# Patient Record
Sex: MALE | Race: WHITE | NOT HISPANIC OR LATINO | Employment: OTHER | URBAN - NONMETROPOLITAN AREA
[De-identification: names, ages, dates, MRNs, and addresses within clinical notes are randomized per-mention and may not be internally consistent; named-entity substitution may affect disease eponyms.]

---

## 2018-03-22 ENCOUNTER — OFFICE VISIT CONVERTED (OUTPATIENT)
Dept: FAMILY MEDICINE CLINIC | Age: 43
End: 2018-03-22
Attending: NURSE PRACTITIONER

## 2018-04-17 ENCOUNTER — OFFICE VISIT CONVERTED (OUTPATIENT)
Dept: CARDIOLOGY | Facility: CLINIC | Age: 43
End: 2018-04-17
Attending: INTERNAL MEDICINE

## 2018-05-15 ENCOUNTER — OFFICE VISIT CONVERTED (OUTPATIENT)
Dept: FAMILY MEDICINE CLINIC | Age: 43
End: 2018-05-15
Attending: NURSE PRACTITIONER

## 2019-05-13 ENCOUNTER — HOSPITAL ENCOUNTER (OUTPATIENT)
Dept: OTHER | Facility: HOSPITAL | Age: 44
Discharge: HOME | End: 2019-05-13
Attending: NURSE PRACTITIONER

## 2019-05-13 ENCOUNTER — OFFICE VISIT CONVERTED (OUTPATIENT)
Dept: FAMILY MEDICINE CLINIC | Age: 44
End: 2019-05-13
Attending: NURSE PRACTITIONER

## 2019-05-13 LAB
ALBUMIN SERPL-MCNC: 4.8 G/DL (ref 3.5–5)
ALBUMIN/GLOB SERPL: 1.8 {RATIO} (ref 1.4–2.6)
ALP SERPL-CCNC: 80 U/L (ref 53–128)
ALT SERPL-CCNC: 22 U/L (ref 10–40)
ANION GAP SERPL CALC-SCNC: 15 MMOL/L (ref 8–19)
AST SERPL-CCNC: 20 U/L (ref 15–50)
BILIRUB SERPL-MCNC: 0.27 MG/DL (ref 0.2–1.3)
BUN SERPL-MCNC: 13 MG/DL (ref 5–25)
BUN/CREAT SERPL: 11 {RATIO} (ref 6–20)
CALCIUM SERPL-MCNC: 9.8 MG/DL (ref 8.7–10.4)
CHLORIDE SERPL-SCNC: 100 MMOL/L (ref 99–111)
CONV CO2: 28 MMOL/L (ref 22–32)
CONV TOTAL PROTEIN: 7.4 G/DL (ref 6.3–8.2)
CREAT UR-MCNC: 1.21 MG/DL (ref 0.7–1.2)
ERYTHROCYTE [DISTWIDTH] IN BLOOD BY AUTOMATED COUNT: 12 % (ref 11.5–14.5)
GFR SERPLBLD BASED ON 1.73 SQ M-ARVRAT: >60 ML/MIN/{1.73_M2}
GLOBULIN UR ELPH-MCNC: 2.6 G/DL (ref 2–3.5)
GLUCOSE SERPL-MCNC: 106 MG/DL (ref 70–99)
HBA1C MFR BLD: 15.7 G/DL (ref 14–18)
HCT VFR BLD AUTO: 44.7 % (ref 42–52)
MAGNESIUM SERPL-MCNC: 2.23 MG/DL (ref 1.6–2.3)
MCH RBC QN AUTO: 30.2 PG (ref 27–31)
MCHC RBC AUTO-ENTMCNC: 35.1 G/DL (ref 33–37)
MCV RBC AUTO: 86 FL (ref 80–96)
OSMOLALITY SERPL CALC.SUM OF ELEC: 287 MOSM/KG (ref 273–304)
PLATELET # BLD AUTO: 229 10*3/UL (ref 130–400)
PMV BLD AUTO: 11.1 FL (ref 7.4–10.4)
POTASSIUM SERPL-SCNC: 4.9 MMOL/L (ref 3.5–5.3)
RBC # BLD AUTO: 5.2 10*6/UL (ref 4.7–6.1)
SODIUM SERPL-SCNC: 138 MMOL/L (ref 135–147)
T4 FREE SERPL-MCNC: 1.3 NG/DL (ref 0.9–1.8)
TSH SERPL-ACNC: 4.69 M[IU]/L (ref 0.27–4.2)
WBC # BLD AUTO: 4.69 10*3/UL (ref 4.8–10.8)

## 2019-05-15 LAB — CONV ANTI MICROSOMAL AB: 12 IU/ML (ref 0–34)

## 2019-05-20 ENCOUNTER — OFFICE VISIT CONVERTED (OUTPATIENT)
Dept: UROLOGY | Facility: CLINIC | Age: 44
End: 2019-05-20
Attending: UROLOGY

## 2019-07-02 ENCOUNTER — HOSPITAL ENCOUNTER (OUTPATIENT)
Dept: PERIOP | Facility: HOSPITAL | Age: 44
Setting detail: HOSPITAL OUTPATIENT SURGERY
Discharge: HOME | End: 2019-07-02
Attending: UROLOGY

## 2019-08-06 ENCOUNTER — CONVERSION ENCOUNTER (OUTPATIENT)
Dept: OTHER | Facility: HOSPITAL | Age: 44
End: 2019-08-06

## 2019-08-06 ENCOUNTER — OFFICE VISIT CONVERTED (OUTPATIENT)
Dept: CARDIOLOGY | Facility: CLINIC | Age: 44
End: 2019-08-06
Attending: INTERNAL MEDICINE

## 2019-08-12 ENCOUNTER — CONVERSION ENCOUNTER (OUTPATIENT)
Dept: SURGERY | Facility: CLINIC | Age: 44
End: 2019-08-12

## 2019-08-12 ENCOUNTER — OFFICE VISIT CONVERTED (OUTPATIENT)
Dept: UROLOGY | Facility: CLINIC | Age: 44
End: 2019-08-12
Attending: UROLOGY

## 2019-11-20 ENCOUNTER — OFFICE VISIT CONVERTED (OUTPATIENT)
Dept: CARDIOLOGY | Facility: CLINIC | Age: 44
End: 2019-11-20
Attending: INTERNAL MEDICINE

## 2019-11-20 ENCOUNTER — CONVERSION ENCOUNTER (OUTPATIENT)
Dept: CARDIOLOGY | Facility: CLINIC | Age: 44
End: 2019-11-20

## 2020-09-03 ENCOUNTER — OFFICE VISIT CONVERTED (OUTPATIENT)
Dept: FAMILY MEDICINE CLINIC | Age: 45
End: 2020-09-03
Attending: NURSE PRACTITIONER

## 2020-09-09 ENCOUNTER — OFFICE VISIT CONVERTED (OUTPATIENT)
Dept: CARDIOLOGY | Facility: CLINIC | Age: 45
End: 2020-09-09
Attending: INTERNAL MEDICINE

## 2020-09-09 ENCOUNTER — CONVERSION ENCOUNTER (OUTPATIENT)
Dept: OTHER | Facility: HOSPITAL | Age: 45
End: 2020-09-09

## 2020-09-09 ENCOUNTER — HOSPITAL ENCOUNTER (OUTPATIENT)
Dept: OTHER | Facility: HOSPITAL | Age: 45
Discharge: HOME | End: 2020-09-09
Attending: NURSE PRACTITIONER

## 2020-09-09 LAB
ALBUMIN SERPL-MCNC: 4.4 G/DL (ref 3.5–5)
ALBUMIN/GLOB SERPL: 1.7 {RATIO} (ref 1.4–2.6)
ALP SERPL-CCNC: 69 U/L (ref 53–128)
ALT SERPL-CCNC: 23 U/L (ref 10–40)
ANION GAP SERPL CALC-SCNC: 17 MMOL/L (ref 8–19)
AST SERPL-CCNC: 21 U/L (ref 15–50)
BASOPHILS # BLD MANUAL: 0.02 10*3/UL (ref 0–0.2)
BASOPHILS NFR BLD MANUAL: 0.4 % (ref 0–3)
BILIRUB SERPL-MCNC: 0.38 MG/DL (ref 0.2–1.3)
BUN SERPL-MCNC: 18 MG/DL (ref 5–25)
BUN/CREAT SERPL: 15 {RATIO} (ref 6–20)
CALCIUM SERPL-MCNC: 9.6 MG/DL (ref 8.7–10.4)
CHLORIDE SERPL-SCNC: 104 MMOL/L (ref 99–111)
CONV CO2: 24 MMOL/L (ref 22–32)
CONV TOTAL PROTEIN: 7 G/DL (ref 6.3–8.2)
CREAT UR-MCNC: 1.2 MG/DL (ref 0.7–1.2)
DEPRECATED RDW RBC AUTO: 37.7 FL
EOSINOPHIL # BLD MANUAL: 0.19 10*3/UL (ref 0–0.7)
EOSINOPHIL NFR BLD MANUAL: 4 % (ref 0–7)
ERYTHROCYTE [DISTWIDTH] IN BLOOD BY AUTOMATED COUNT: 11.7 % (ref 11.5–14.5)
GFR SERPLBLD BASED ON 1.73 SQ M-ARVRAT: >60 ML/MIN/{1.73_M2}
GLOBULIN UR ELPH-MCNC: 2.6 G/DL (ref 2–3.5)
GLUCOSE SERPL-MCNC: 104 MG/DL (ref 70–99)
GRANS (ABSOLUTE): 2.7 10*3/UL (ref 2–8)
GRANS: 57.2 % (ref 30–85)
HBA1C MFR BLD: 14.5 G/DL (ref 14–18)
HCT VFR BLD AUTO: 43.1 % (ref 42–52)
IMM GRANULOCYTES # BLD: 0.01 10*3/UL (ref 0–0.54)
IMM GRANULOCYTES NFR BLD: 0.2 % (ref 0–0.43)
LYMPHOCYTES # BLD MANUAL: 1.48 10*3/UL (ref 1–5)
LYMPHOCYTES NFR BLD MANUAL: 6.8 % (ref 3–10)
MAGNESIUM SERPL-MCNC: 2.02 MG/DL (ref 1.6–2.3)
MCH RBC QN AUTO: 29.2 PG (ref 27–31)
MCHC RBC AUTO-ENTMCNC: 33.6 G/DL (ref 33–37)
MCV RBC AUTO: 86.9 FL (ref 80–96)
MONOCYTES # BLD AUTO: 0.32 10*3/UL (ref 0.2–1.2)
OSMOLALITY SERPL CALC.SUM OF ELEC: 292 MOSM/KG (ref 273–304)
PLATELET # BLD AUTO: 207 10*3/UL (ref 130–400)
PMV BLD AUTO: 10.7 FL (ref 7.4–10.4)
POTASSIUM SERPL-SCNC: 4.5 MMOL/L (ref 3.5–5.3)
RBC # BLD AUTO: 4.96 10*6/UL (ref 4.7–6.1)
SODIUM SERPL-SCNC: 140 MMOL/L (ref 135–147)
TSH SERPL-ACNC: 2.37 M[IU]/L (ref 0.27–4.2)
VARIANT LYMPHS NFR BLD MANUAL: 31.4 % (ref 20–45)
WBC # BLD AUTO: 4.72 10*3/UL (ref 4.8–10.8)

## 2020-09-22 ENCOUNTER — TRANSCRIBE ORDERS (OUTPATIENT)
Dept: CARDIOLOGY | Facility: CLINIC | Age: 45
End: 2020-09-22

## 2020-09-22 DIAGNOSIS — R07.89 CHEST PAIN, ATYPICAL: Primary | ICD-10-CM

## 2020-10-15 ENCOUNTER — HOSPITAL ENCOUNTER (OUTPATIENT)
Dept: CT IMAGING | Facility: HOSPITAL | Age: 45
Discharge: HOME | End: 2020-10-15

## 2021-05-13 NOTE — PROGRESS NOTES
Progress Note      Patient Name: Nabil Connell   Patient ID: [id number]   Sex: Male   YOB: 1975    Primary Care Provider: Taya MILES   Referring Provider: Lalit Bella MD    Visit Date: September 9, 2020    Provider: Farshad Clinton MD   Location: [facility] [place]   Location Address: [address] [place] [address] [facility]  [address] [place] [address]  [id number]   Location Phone: [phone]          Chief Complaint     Followup visit, patient reports chest pressure and some dizziness.       History Of Present Illness  REFERRING CARE PROVIDER: Lalit Bella MD   Nabil Connell is a 44 year old /White male with hypertension, anxiety disorder, and PTSD who is here for a followup visit. For the past several weeks, patient has been having on and off chest pain, both at rest and with activities. For two weeks, he has been having constant chest pain on the left side of the chest. He also feels some dizziness. No shortness of breath or syncopal episodes. He was seen in his PCP's office, who recommended a close cardiology followup. Blood pressure is noted to be well controlled per home recordings.   PAST MEDICAL HISTORY: 1) Mild mitral valve prolapse with evidence of mild mitral regurgitation; 2) Hypertension; 3) Anxiety disorder; 4) PTSD; 5) Negative for coronary artery disease, diabetes, or hypertension.   PSYCHOSOCIAL HISTORY: Denies alcohol or tobacco use. Admits mood changes and depression.   CURRENT MEDICATIONS: Carvedilol 12.5 mg b.i.d.; omeprazole 20 mg daily; Percocet 5-325 mg p.r.n.       Review of Systems  · Cardiovascular  o Admits  o : palpitations (fast, fluttering, or skipping beats), shortness of breath while walking or lying flat, chest pain or angina pectoris   o Denies  o : swelling (feet, ankles, hands)  · Respiratory  o Denies  o : chronic or frequent cough, asthma or wheezing      Vitals  Date Time BP Position Site L\R Cuff Size HR RR TEMP (F) WT  HT  BMI kg/m2  "BSA m2 O2 Sat HC       09/09/2020 10:00 /87 Sitting    79 - R   240lbs 0oz 6'  4\" 29.21 2.42           Physical Examination  · Respiratory  o Auscultation of Lungs  o : Clear to auscultation bilaterally. No crackles or rhonchi.  · Cardiovascular  o Heart  o : S1, S2 normally heard. No S3. No murmur, rubs, or gallops.  · Gastrointestinal  o Abdominal Examination  o : Soft, nontender, nondistended. No free fluid. Bowel sounds heard in all four quadrants.  · Extremities  o Extremities  o : Warm and well perfused. No pitting pedal edema. Distal pulses present.  · EKG  o EKG  o : Performed in the office today.  o Indications  o : Chest pain.  o Results  o : Normal sinus rhythm. Incomplete right bundle branch block. Otherwise normal EKG.  o Comparison  o : When compared to previous stress EKG on 12/04/2019, there are no changes.           Assessment     ASSESSMENT & PLAN:    1.  Chest pain.  Symptoms are rather atypical for angina.  It is a constant chest pain.  Risk factors include        hypertension.  Previous exercise treadmill stress test and echocardiogram were unremarkable.  Will        proceed with CT coronary angiogram to delineate the coronary anatomy to assess for coronary artery        disease as etiology of chest pain.    2.  Hypertension, very well controlled.  Continue current regimen.  3.  Followup with CT coronary angiogram report.        MD SONAL Banda:apurva             Electronically Signed by: Lamar Alberto-, Other -Author on September 14, 2020 08:23:54 AM  Electronically Co-signed by: Farshad Clinton MD -Reviewer on September 14, 2020 09:35:46 AM  "

## 2021-05-14 VITALS
HEIGHT: 76 IN | WEIGHT: 240 LBS | SYSTOLIC BLOOD PRESSURE: 122 MMHG | HEART RATE: 79 BPM | BODY MASS INDEX: 29.22 KG/M2 | DIASTOLIC BLOOD PRESSURE: 87 MMHG

## 2021-05-15 VITALS — HEIGHT: 76 IN | BODY MASS INDEX: 29.41 KG/M2 | WEIGHT: 241.5 LBS | RESPIRATION RATE: 16 BRPM

## 2021-05-15 VITALS
WEIGHT: 240.25 LBS | BODY MASS INDEX: 29.26 KG/M2 | HEART RATE: 95 BPM | SYSTOLIC BLOOD PRESSURE: 124 MMHG | HEIGHT: 76 IN | DIASTOLIC BLOOD PRESSURE: 95 MMHG

## 2021-05-15 VITALS — HEIGHT: 76 IN | BODY MASS INDEX: 29.35 KG/M2 | RESPIRATION RATE: 16 BRPM | WEIGHT: 241 LBS

## 2021-05-15 VITALS
BODY MASS INDEX: 29.22 KG/M2 | SYSTOLIC BLOOD PRESSURE: 124 MMHG | HEART RATE: 79 BPM | HEIGHT: 76 IN | DIASTOLIC BLOOD PRESSURE: 91 MMHG | WEIGHT: 240 LBS

## 2021-05-16 VITALS
HEART RATE: 93 BPM | SYSTOLIC BLOOD PRESSURE: 124 MMHG | DIASTOLIC BLOOD PRESSURE: 97 MMHG | BODY MASS INDEX: 29.27 KG/M2 | HEIGHT: 76 IN | WEIGHT: 240.37 LBS

## 2021-05-18 NOTE — PROGRESS NOTES
Nabil Connell 1975     Office/Outpatient Visit    Visit Date: Mon, May 13, 2019 09:03 am    Provider: Toshia Marsh N.P. (Assistant: Sarah Spurling, MA)    Location: [facility]        Electronically signed by Toshia Marsh N.P. on  05/13/2019 11:14:56 AM                             SUBJECTIVE:        CC:     Mr. Connell is a 43 year old White male.  Pain on his left side, in his collar bone and shoulder. It has been going on for over a month.          HPI:         Patient complains of shoulder pain.  He complains of left shoulder pain.  The location of the pain is anterior and superior.  It radiates to the neck.  The pain initially started 2 months ago.  The apparent precipitating event was he does report that he has been using his phone more frequently than normal.  He describes it as moderate in severity.  Related symptoms include occasionally chest pain, occasionally acid reflux (not sure if same time as the anterior shoulder pain).  There are no known aggravating factors.  Past medical history is pertinent for does have schrapnal in the left shoulder but no known injury in his right.          He is also requesting a referral for urology to evaluate Hydrocele - he was at [facility] in January and had an US  - hydrocele noted and would like to see if he needs or can have anything done - some discomfort - but not pain- he does wear what he considers supportive underwear     ROS:     CONSTITUTIONAL:  Negative for chills, fatigue and fever.      CARDIOVASCULAR:  Positive for chest pain ( occasional ) and palpitations ( sometimes HR goes up in the 140s-160s ).   Negative for orthopnea or pedal edema.      RESPIRATORY:  Negative for dyspnea and cough.      GASTROINTESTINAL:  Positive for acid reflux symptoms.   Negative for abdominal pain.      MUSCULOSKELETAL:  Positive for left anterior shoulder pain.      PSYCHIATRIC:  Negative for anxiety and depression.          PMH/FMH/SH:     Last  Reviewed on 5/13/2019 09:13 AM by Toshia Marsh    Past Medical History:             CURRENT MEDICAL PROVIDERS:    Neurologist    Psychiatrist    [facility]          Surgical History:         Positive for    SCHRAPNEL FROM R SHOULDER;;         Family History:     Unremarkable         Social History:     Occupation:    Retired     Marital Status:          Tobacco/Alcohol/Supplements:     Last Reviewed on 5/13/2019 09:05 AM by Spurling, Sarah C    Tobacco: He has never smoked.  Non-drinker     Caffeine:  He admits to consuming caffeine via soda ( 2 servings per day ).          Substance Abuse History:     Last Reviewed on 5/25/2017 02:17 PM by Taya Freitas    NEGATIVE         Mental Health History:     Last Reviewed on 5/25/2017 02:17 PM by Taya Freitas        Communicable Diseases (eg STDs):     Last Reviewed on 5/25/2017 02:17 PM by Taya Freitas            Current Problems:     Last Reviewed on 5/13/2019 09:12 AM by Toshia Marsh    Hip pain     Chronic low back pain     Cardiac murmur     Irritable bowel syndrome     Acquired hypothyroidism     Episodic cluster headache     Chronic PTSD     GERD     High cholesterol     Hydrocele, other specified type     Palpitations     Shoulder pain         Immunizations:     None        Allergies:     Last Reviewed on 5/13/2019 09:05 AM by Spurling, Sarah C      No Known Drug Allergies.         Current Medications:     Last Reviewed on 5/13/2019 09:07 AM by Spurling, Sarah C    Percocet  5mg/325mg Tablet 0NE TO TWO Q 4 HOURS prn     Ibuprofen 600mg Tablet 1 tab q 8hrs     Metoprolol Tartrate 50mg Tablet 1 tab bid     Diazepam 10mg Tablet ONE A DAY PRN.         OBJECTIVE:        Vitals:         Historical:     05/15/2018  BP:   125/89 mm Hg ( (left arm, , sitting, );)     03/22/2018  BP:   112/82 mm Hg ( (left arm, , sitting, );)         Current: 5/13/2019 9:09:07 AM    Ht:  6 ft, 3 in;  Wt: 244.4 lbs;  BMI: 30.5    T: 97.6 F (oral);  BP: 132/89 mm Hg  (right arm, sitting);  P: 80 bpm (right arm (BP Cuff), sitting);  sCr: 1.2 mg/dL;  GFR: 89.78        Exams:     PHYSICAL EXAM:     GENERAL: Vitals recorded well developed, well nourished;  well groomed;  no apparent distress;     NECK:  supple, full ROM; no thyromegaly; no carotid bruits;     RESPIRATORY: normal respiratory rate and pattern with no distress; normal breath sounds with no rales, rhonchi, wheezes or rubs;     CARDIOVASCULAR: normal rate; rhythm is regular;  normal S1; normal S2; no systolic murmur; no cyanosis; no edema;     SKIN: swelling in the anterior clavical; some swelling to the anterior left  clavicle area-- no tenderness noted- no pain with any ROM;     MUSCULOSKELETAL: normal gait; normal range of motion of all major muscle groups; no limb or joint pain with range of motion;     NEUROLOGICAL:  cranial nerves, motor and sensory function, reflexes, gait and coordination are all intact;     PSYCHIATRIC:  appropriate affect and demeanor; normal speech pattern; grossly normal memory;         Lab/Test Results:         LABORATORY RESULTS: EKG performed by [facility]         ASSESSMENT:           719.41   M25.512  Shoulder pain              DDx:     785.1   R00.2  Palpitations              DDx:     603.8   N43.2  Hydrocele, other specified type              DDx:         ORDERS:         Radiology/Test Orders:       [id number]  Electrocardiogram, routine with at least 12 leads; with interpretation and report  (In-House)         [id number]  Left Xray Clavicle, complete  (Send-Out)           Lab Orders:       [id number]  BD2 - [facility] CBC w/o diff  (Send-Out)         18801  COMP - H Comp. Metabolic Panel  (Send-Out)         24297  TSH - [facility] TSH  (Send-Out)         28883  MG - [facility] Magnesium, Serum  (Send-Out)           Procedures Ordered:       REFER  Referral to Specialist or Other Facility  (Send-Out)                   PLAN:          Shoulder pain         RADIOLOGY:  I have ordered Clavicle Left clavicle to be done today.       TESTS/PROCEDURES:  Will proceed with an ECG to be performed/scheduled now.            Orders:       [id number]  Electrocardiogram, routine with at least 12 leads; with interpretation and report  (In-House)         [id number]  Left Xray Clavicle, complete  (Send-Out)            Palpitations reschedule with cardiology - Dr. Clinton     LABORATORY:  Labs ordered to be performed today include CBC W/O DIFF, Comprehensive metabolic panel, Magnesium level, and TSH.            Orders:       [id number]  BDCB2 - [facility] CBC w/o diff  (Send-Out)         85540  COMP - [facility] Comp. Metabolic Panel  (Send-Out)         27344  TSH - [facility] TSH  (Send-Out)         43052  MG - [facility] Magnesium, Serum  (Send-Out)            Hydrocele, other specified type instructed to wear good supportive underwear (jock strap may be of more benefit) for comfort         REFERRALS:  Referral initiated to a urologist ( for evaluation of hydrocele ).            Orders:       REFER  Referral to Specialist or Other Facility  (Send-Out)               CHARGE CAPTURE:           Primary Diagnosis:     719.41 Shoulder pain            M25.512    Pain in left shoulder              Orders:          12876   Office/outpatient visit; established patient, level 3  (In-House)             19769   Electrocardiogram, routine with at least 12 leads; with interpretation and report  (In-House)           785.1 Palpitations            R00.2    Palpitations    603.8 Hydrocele, other specified type            N43.2    Other hydrocele
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
## 2021-05-18 NOTE — PROGRESS NOTES
Nabil Connell 1975     Office/Outpatient Visit    Visit Date: Tue, May 15, 2018 11:45 am    Provider: Taya Freitas N.P. (Assistant: Alize Bailey MA)    Location: [facility]        Electronically signed by aTya Freitas N.P. on  05/29/2018 11:29:25 AM                             SUBJECTIVE:        CC: Patient also seen by Colleen Bryan, Nurse practitioner student        Mr. Connell is a 42 year old White male.  Discuss Refferal and Paperwork         HPI:         Chronic low back pain noted.  Other details: Pt c/o low back pain. Has been a chronic pain for him with flares. He is requesting further workup due to having multiple flares..          Additionally, he presents with history of hip pain.      pt states R hip pain along with low back pain.          With regard to the ankle pain, other details: Pt states R ankle pain x over 1 week. Denies injury. Otc meds used without much relief..      ROS:     CONSTITUTIONAL:  Negative for chills, fatigue, fever and weight change.      CARDIOVASCULAR:  Negative for chest pain, orthopnea, paroxysmal nocturnal dyspnea and pedal edema.      RESPIRATORY:  Negative for dyspnea and cough.      GASTROINTESTINAL:  Negative for abdominal pain, heartburn, constipation, diarrhea, and stool changes.      MUSCULOSKELETAL:  Positive for back pain ( chronic ), joint stiffness, limb pain ( right leg pain ) and myalgias.      PSYCHIATRIC:  Positive for PTSD.   Negative for anxiety or depression.          PMH/FMH/SH:     Last Reviewed on 5/25/2017 02:17 PM by Taya Freitas    Past Medical History:             CURRENT MEDICAL PROVIDERS:    Neurologist    Psychiatrist    V.A.          Surgical History:         Positive for    SCHRAPNEL FROM R SHOULDER;;         Family History:     Unremarkable         Social History:     Occupation:    Retired     Marital Status:          Tobacco/Alcohol/Supplements:     Last Reviewed on 3/22/2018 01:37 PM by  Maribel Bustos    Tobacco: He has never smoked.  Non-drinker     Caffeine:  He admits to consuming caffeine via soda ( 2 servings per day ).          Substance Abuse History:     Last Reviewed on 5/25/2017 02:17 PM by Taya Freitas    NEGATIVE         Mental Health History:     Last Reviewed on 5/25/2017 02:17 PM by Taya Freitas        Communicable Diseases (eg STDs):     Last Reviewed on 5/25/2017 02:17 PM by Taya Freitas            Current Problems:     Last Reviewed on 5/25/2017 02:17 PM by Taya Freitas    Cardiac murmur     Irritable bowel syndrome     Plantar wart     Otitis media with effusion     Post-Traumatic Stress Disorder (PTSD)     Acquired hypothyroidism     Episodic cluster headache     Chronic PTSD     GERD     High cholesterol     Influenza symptoms     Muscle pain     Urinary Tract Infection         Immunizations:     None        Allergies:     Last Reviewed on 3/22/2018 01:36 PM by Maribel Bustos      No Known Drug Allergies.         Current Medications:     Last Reviewed on 3/22/2018 01:37 PM by Maribel Bustos    Pravastatin 20mg Tablet Take 1 tablet(s) by mouth daily     Ibuprofen 600mg Tablet 1 tab q 8hrs     Nitrostat 0.4mg Tablets, Sublingual PRN     Metoprolol Tartrate 50mg Tablet 1 tab bid     Diazepam 10mg Tablet ONE A DAY PRN.         OBJECTIVE:        Vitals:         Current: 5/15/2018 11:49:29 AM    Ht:  6 ft, 3 in;  Wt: 242 lbs;  BMI: 30.2    T: 97.9 F (oral);  BP: 125/89 mm Hg (left arm, sitting);  P: 82 bpm (left arm (BP Cuff), sitting);  sCr: 1.2 mg/dL;  GFR: 90.32        Exams:     PHYSICAL EXAM:     GENERAL: Vitals recorded well developed, well nourished;  well groomed;  no apparent distress;     EYES: lids and lacrimal system are normal in appearance; extraocular movements intact; conjunctiva and cornea are normal; PERRLA;     NECK:  supple, full ROM; no thyromegaly; no carotid bruits;     RESPIRATORY: normal respiratory rate and pattern with no distress;  normal breath sounds with no rales, rhonchi, wheezes or rubs;     CARDIOVASCULAR: normal rate; rhythm is regular;  normal S1; normal S2; no systolic murmur; no cyanosis; no edema;     GASTROINTESTINAL: nontender, nondistended; no hepatosplenomegaly or masses; no bruits;     SKIN:  no significant rashes or lesions; no suspicious moles;     MUSCULOSKELETAL: R ankle tenderness with ROM, low back tenderness, SLR neg. R hip tenderness with palpation and rotation.;     NEUROLOGICAL:  cranial nerves, motor and sensory function, reflexes, gait and coordination are all intact;     PSYCHIATRIC:  appropriate affect and demeanor; normal speech pattern; grossly normal memory;         ASSESSMENT:           724.2   M54.5  Chronic low back pain              DDx:     719.45   M25.551  Hip pain              DDx:     719.47   M25.579  Ankle pain              DDx:         ORDERS:         Radiology/Test Orders:       [id number]  Radiologic examination, spine, lumbosacral;  minimum of four views  (Send-Out)         [id number]  Right radiologic exam, hip, unilateral; complete, minimum of two views  (Send-Out)         [id number]  Radiologic examination, right ankle complete minimum 3 views  (Send-Out)                   PLAN:          Chronic low back pain         RADIOLOGY:  I have ordered Lumbar/Sacral Spine X-ray to be done today.      FOLLOW-UP: Advised to call if there is no improvement..   Chronic visit follow up           Orders:       [id number]  Radiologic examination, spine, lumbosacral;  minimum of four views  (Send-Out)            Hip pain         RADIOLOGY:  I have ordered a right hip x-ray to be done today.            Orders:       [id number]  Right radiologic exam, hip, unilateral; complete, minimum of two views  (Send-Out)             Patient Education Handouts:       Arthritis           Ankle pain         RADIOLOGY:  I have ordered a right ankle xray to be done today.      FOLLOW-UP: Advised to call if there is no improvement..   Chronic  visit follow up           Orders:       [id number]  Radiologic examination, right ankle complete minimum 3 views  (Send-Out)               Patient Recommendations:        For  Chronic low back pain:                     APPOINTMENT INFORMATION:        Monday Tuesday Wednesday Thursday Friday Saturday Sunday            Time:___________________AM  PM   Date:_____________________         For  Ankle pain:     right ankle x-ray                 APPOINTMENT INFORMATION:        Monday Tuesday Wednesday Thursday Friday Saturday Sunday            Time:___________________AM  PM   Date:_____________________             CHARGE CAPTURE:           Primary Diagnosis:     724.2 Chronic low back pain            M54.5    Low back pain              Orders:          [id number]   Office/outpatient visit; established patient, level 3  (In-House)           719.45 Hip pain            M25.551    Pain in right hip    719.47 Ankle pain            M25.579    Pain in unspecified ankle and joints of unspecified foot        ADDENDUMS:      ____________________________________    Addendum: 06/06/2018 10:41 AM - Taya Freitas        719.47 Ankle pain          Remove:  M25.579   Pain in unspecified ankle and joints of unspecified foot    Add: M25.571 Pain in ankle and joint of right foot.

## 2021-05-18 NOTE — PROGRESS NOTES
"Nabil Connell  1975     Office/Outpatient Visit    Visit Date: Thu, Sep 3, 2020 03:24 pm    Provider: Lauren Moreira N.P. (Assistant: Shakira Huynh MA)    Location: [facility]        Electronically signed by Lauren Moreira N.P. on  09/09/2020 08:10:25 AM                             Subjective:        CC: Mr. Connell is a 44 year old White male.  chest pain, lightheaded, dizzy, left arm pain started just in the past week         HPI: Nabil presents with c/o bilateral chest pain. Dizziness. Worse when standing. First noticed 2 weeks ago. [place] Notices 4-5 times per day. Wakes up and feels like his entire body is vibrating. Previously had Nitroglycerin prescribed by cardiologist. Due for appt with cardiology. History of 'leaky heart valves' and anxiety/PTSD.    ROS:     CONSTITUTIONAL:  Negative for chills and fever.      CARDIOVASCULAR:  Positive for chest pain and dizziness.      RESPIRATORY:  Negative for dyspnea and frequent wheezing.      NEUROLOGICAL:  Positive for headaches ( was previously getting botox for cluster headaches but they stopped approving ).   Negative for fainting.      PSYCHIATRIC:  Negative for depression and suicidal thoughts.          Past Medical History / Family History / Social History:         Last Reviewed on 9/03/2020 03:51 PM by Lauren Moreira    Past Medical History:             PAST MEDICAL HISTORY         \"leaky heart valves\"     chronic back and hip pain     cluster migraines    TBI     PTSD         CURRENT MEDICAL PROVIDERS:    Cardiologist: Dr. Clinton    Neurologist: (no longer seeing)    Psychiatrist: (no longer seeing)    V.A.          Surgical History:         Positive for    SCHRAPNEL FROM R SHOULDER;,    Hydrocele surgery; and    right knee surgery;;         Family History:     Father: Hypertension     Mother: afib     Sister(s): afib         Social History:     Occupation: Retired (Prior occupation: " [employer])     Marital Status:      Children: 2 children         Tobacco/Alcohol/Supplements:     Last Reviewed on 9/03/2020 03:28 PM by Shakira Huynh    Tobacco: He has never smoked.  Non-drinker         Substance Abuse History:     Last Reviewed on 5/25/2017 02:17 PM by Taya Freitas    NEGATIVE         Mental Health History:     Last Reviewed on 5/25/2017 02:17 PM by Taya Freitas        Communicable Diseases (eg STDs):     Last Reviewed on 5/25/2017 02:17 PM by Taya Freitas        Current Problems:     Last Reviewed on 5/13/2019 09:12 AM by Toshia Marsh    Episodic cluster headache    Chronic PTSD    GERD    High cholesterol    Acquired hypothyroidism    Irritable bowel syndrome without diarrhea    Cardiac murmur, unspecified    Pain in right hip    Low back pain        Immunizations:     None        Allergies:     Last Reviewed on 9/03/2020 03:28 PM by Shakira Huynh    No Known Allergies.        Current Medications:     Last Reviewed on 9/03/2020 03:28 PM by Shakira Huynh    HYDROcodone-acetaminophen 7.5-325 mg oral tablet [take 1 tablet by oral route prn ]    Carvedilol 12.5mg  [TAKE 1 TABLET BY MOUTH TWICE DAILY WITH FOOD]        Objective:        Vitals:         Historical:     5/13/2019  BP:   132/89 mm Hg ( (right arm, , sitting, );) 5/13/2019  HR:   81bpm5/13/2019  Wt:   244.4lbs    Current: 9/3/2020 3:31:52 PM    Ht:  6 ft, 3 in;  Wt: 239.2 lbs;  BMI: 29.9T: 97.1 F (temporal);  BP: 121/96 mm Hg (left arm, sitting);  P: 98 bpm (left arm (BP Cuff), sitting);  sCr: 1.21 mg/dL;  GFR: 87.32        Repeat:     4:13:30 PM  BP:   108/74mm Hg (left arm, lying) 4:15:17 PM  BP:   121/87mm Hg (left arm, sitting) 4:17:15 PM  BP:   109/80mm Hg (left arm, standing) 4:13:39 PM  P:   83bpm (left arm (BP Cuff), lying) 4:15:25 PM  P:   94bpm (left arm (BP Cuff), sitting) 4:17:24 PM  P:   105bpm (left arm (BP Cuff), standing)     Exams:     PHYSICAL EXAM:     GENERAL: well developed, well nourished;  no  apparent distress;     RESPIRATORY: normal respiratory rate and pattern with no distress; normal breath sounds with no rales, rhonchi, wheezes or rubs;     CARDIOVASCULAR: normal rate; rhythm is regular;     NEUROLOGIC: mental status: alert and oriented x 3; GROSSLY INTACT     PSYCHIATRIC: appropriate affect and demeanor; normal speech pattern; normal thought and perception;         Lab/Test Results:         LABORATORY RESULTS: EKG performed by tls         Assessment:         R07.9   Chest pain, unspecified           ORDERS:         Radiology/Test Orders:       [id number]  Electrocardiogram, routine with at least 12 leads; with interpretation and report  (In-House)              Lab Orders:       ORTHO  Orthostatic blood pressure  (In-House)            [id number]  [facility] CBC with 3 part diff  (Send-Out)            [id number]  COMP - [facility] Comp. Metabolic Panel  (Send-Out)            58805  MG - [facility] Magnesium, Serum  (Send-Out)            [id number]  TSH - [facility] TSH  (Send-Out)              Procedures Ordered:       REFER  Referral to Specialist or Other Facility  (Send-Out)                      Plan:         Chest pain, unspecifiedECG without acute ST or T wave abnormalities. Orthostatics ok. Checking labs to rule out thyroid abnormalities, anemia, electrolyte abnormalities. Follow up with cardiology.     LABORATORY:  Labs ordered to be performed today include CBC, Comprehensive metabolic panel, Magnesium level, and TSH.      TESTS/PROCEDURES:  Will proceed with an ECG and Orthostatic Blood pressures to be performed/scheduled now.      REFERRALS:  Referral initiated to a cardiologist ( Dr. Farshad Clinton, [facility] ).            Orders:       [id number]  Electrocardiogram, routine with at least 12 leads; with interpretation and report  (In-House)            ORTHO  Orthostatic blood pressure  (In-House)            55247  [facility] CBC with 3 part diff  (Send-Out)            09597  [facility] Comp. Metabolic Panel   (Send-Out)            69473  MG - [facility] Magnesium, Serum  (Send-Out)            66863  TSH - [facility] TSH  (Send-Out)            REFER  Referral to Specialist or Other Facility  (Send-Out)                  Charge Capture:         Primary Diagnosis:     R07.9  Chest pain, unspecified           Orders:      [id number]  Office/outpatient visit; established patient, level 4 (56 minutes)  (In-House)            35666  Electrocardiogram, routine with at least 12 leads; with interpretation and report  (In-House)            ORTHO  Orthostatic blood pressure  (In-House)

## 2021-07-01 VITALS
HEART RATE: 82 BPM | SYSTOLIC BLOOD PRESSURE: 125 MMHG | TEMPERATURE: 97.9 F | BODY MASS INDEX: 30.09 KG/M2 | WEIGHT: 242 LBS | HEIGHT: 75 IN | DIASTOLIC BLOOD PRESSURE: 89 MMHG

## 2021-07-01 VITALS
WEIGHT: 243.9 LBS | SYSTOLIC BLOOD PRESSURE: 112 MMHG | BODY MASS INDEX: 30.33 KG/M2 | DIASTOLIC BLOOD PRESSURE: 82 MMHG | HEART RATE: 98 BPM | TEMPERATURE: 97.2 F | HEIGHT: 75 IN

## 2021-07-01 VITALS
HEIGHT: 75 IN | TEMPERATURE: 97.6 F | BODY MASS INDEX: 30.39 KG/M2 | WEIGHT: 244.4 LBS | SYSTOLIC BLOOD PRESSURE: 132 MMHG | DIASTOLIC BLOOD PRESSURE: 89 MMHG | HEART RATE: 80 BPM

## 2021-07-02 VITALS
HEART RATE: 105 BPM | BODY MASS INDEX: 29.74 KG/M2 | SYSTOLIC BLOOD PRESSURE: 109 MMHG | DIASTOLIC BLOOD PRESSURE: 80 MMHG | HEIGHT: 75 IN | WEIGHT: 239.2 LBS | TEMPERATURE: 97.1 F

## 2022-04-22 ENCOUNTER — OFFICE VISIT (OUTPATIENT)
Dept: FAMILY MEDICINE CLINIC | Age: 47
End: 2022-04-22

## 2022-04-22 ENCOUNTER — TELEPHONE (OUTPATIENT)
Dept: FAMILY MEDICINE CLINIC | Age: 47
End: 2022-04-22

## 2022-04-22 VITALS
HEART RATE: 89 BPM | HEIGHT: 76 IN | DIASTOLIC BLOOD PRESSURE: 86 MMHG | BODY MASS INDEX: 27.4 KG/M2 | TEMPERATURE: 98.1 F | WEIGHT: 225 LBS | SYSTOLIC BLOOD PRESSURE: 113 MMHG

## 2022-04-22 DIAGNOSIS — K04.7 DENTAL INFECTION: ICD-10-CM

## 2022-04-22 DIAGNOSIS — R59.1 LYMPHADENOPATHY: Primary | ICD-10-CM

## 2022-04-22 PROCEDURE — 99214 OFFICE O/P EST MOD 30 MIN: CPT | Performed by: NURSE PRACTITIONER

## 2022-04-22 RX ORDER — OXYCODONE HYDROCHLORIDE AND ACETAMINOPHEN 5; 325 MG/1; MG/1
TABLET ORAL
COMMUNITY
End: 2022-04-22

## 2022-04-22 RX ORDER — FLUOXETINE 20 MG/1
20 TABLET, FILM COATED ORAL DAILY
COMMUNITY

## 2022-04-22 RX ORDER — HYDROCODONE BITARTRATE AND ACETAMINOPHEN 7.5; 3 MG/1; MG/1
TABLET ORAL AS NEEDED
COMMUNITY

## 2022-04-22 RX ORDER — AMOXICILLIN AND CLAVULANATE POTASSIUM 875; 125 MG/1; MG/1
1 TABLET, FILM COATED ORAL 2 TIMES DAILY
Qty: 20 TABLET | Refills: 0 | Status: SHIPPED | OUTPATIENT
Start: 2022-04-22

## 2022-04-22 RX ORDER — DIAZEPAM 10 MG/1
TABLET ORAL EVERY 8 HOURS SCHEDULED
COMMUNITY
End: 2022-04-22

## 2022-04-22 RX ORDER — DULOXETIN HYDROCHLORIDE 60 MG/1
60 CAPSULE, DELAYED RELEASE ORAL AS NEEDED
COMMUNITY

## 2022-04-22 RX ORDER — CARVEDILOL 12.5 MG/1
12.5 TABLET ORAL DAILY
COMMUNITY
Start: 2022-04-14

## 2022-04-22 RX ORDER — IBUPROFEN 600 MG/1
TABLET ORAL
COMMUNITY

## 2022-04-22 RX ORDER — PROPRANOLOL HYDROCHLORIDE 40 MG/5ML
SOLUTION ORAL DAILY
COMMUNITY

## 2022-04-22 RX ORDER — METOPROLOL TARTRATE 50 MG/1
TABLET, FILM COATED ORAL EVERY 12 HOURS SCHEDULED
COMMUNITY

## 2022-04-22 NOTE — PROGRESS NOTES
Chief Complaint  Nabil Connell presents to [facility] for Neck Pain (Lymph nodes on left side of neck, little pain, X1 week)    Subjective          History of Present Illness    Nabil is here today with c/o swollen lymph nodes on left side of neck/upper chest. Pain in back of neck. Denies fever. He does have a crown on left side of mouth that dentist told him was infected. He was not given any antibiotics. He is being sent to see a periodontist.     Review of Systems      No Known Allergies   Past Medical History:   Diagnosis Date   • Anxiety 2005   • Depression 2005    PTSD combat related   • Headache 1998   • Hyperlipidemia 2012   • Hypertension 2012   • Irritable bowel syndrome 2005   • Low back pain 2005     Current Outpatient Medications   Medication Sig Dispense Refill   • carvedilol (COREG) 12.5 MG tablet Take 12.5 mg by mouth Daily.     • DULoxetine (CYMBALTA) 60 MG capsule Take 60 mg by mouth As Needed.     • FLUoxetine (PROzac) 20 MG tablet Take 20 mg by mouth Daily.     • HYDROcodone-Acetaminophen (XODOL) 7.5-300 MG per tablet As Needed.     • ibuprofen (ADVIL,MOTRIN) 600 MG tablet ibuprofen 600 mg oral tablet take 1 tablet (600 mg) by oral route every 8 hours   Active     • metoprolol tartrate (LOPRESSOR) 50 MG tablet Every 12 (Twelve) Hours.     • Omeprazole 20 MG Tablet Delayed Release Dispersible Daily.     • propranolol 40 MG/5ML solution Daily.     • amoxicillin-clavulanate (Augmentin) 875-125 MG per tablet Take 1 tablet by mouth 2 (Two) Times a Day. 20 tablet 0     No current facility-administered medications for this visit.     History reviewed. No pertinent surgical history.   Social History     Tobacco Use   • Smoking status: Never Smoker   • Smokeless tobacco: Never Used   Substance Use Topics   • Alcohol use: Yes     Alcohol/week: 3.0 standard drinks     Types: 3 Shots of liquor per week   • Drug use: Not Currently     Types: Hydrocodone     History  "reviewed. No pertinent family history.  Health Maintenance Due   Topic Date Due   • COLORECTAL CANCER SCREENING  Never done   • ANNUAL PHYSICAL  Never done   • LIPID PANEL  Never done        There is no immunization history on file for this patient.     Objective     Vitals:    04/22/22 1014   BP: 113/86   BP Location: Left arm   Patient Position: Sitting   Pulse: 89   Temp: 98.1 °F (36.7 °C)   TempSrc: Oral   Weight: 102 kg (225 lb)   Height: 193 cm (76\")     Body mass index is 27.39 kg/m².     Physical Exam  Vitals reviewed.   Constitutional:       General: He is not in acute distress.     Appearance: Normal appearance. He is well-developed.   HENT:      Head: Normocephalic and atraumatic.   Cardiovascular:      Rate and Rhythm: Normal rate and regular rhythm.   Pulmonary:      Effort: Pulmonary effort is normal.      Breath sounds: Normal breath sounds.   Chest:   Breasts:      Left: Supraclavicular adenopathy present.       Lymphadenopathy:      Upper Body:      Left upper body: Supraclavicular adenopathy present.   Neurological:      Mental Status: He is alert and oriented to person, place, and time.   Psychiatric:         Mood and Affect: Mood and affect normal.           Result Review :     The following data was reviewed by: GINGER Loredo on 04/22/2022:          TSH (09/09/2020 10:52)  Magnesium (09/09/2020 10:52)  Comprehensive Metabolic Panel (09/09/2020 10:52)  CBC & Differential (09/09/2020 10:52)                  Assessment and Plan      Diagnoses and all orders for this visit:    1. Lymphadenopathy (Primary)  -     amoxicillin-clavulanate (Augmentin) 875-125 MG per tablet; Take 1 tablet by mouth 2 (Two) Times a Day.  Dispense: 20 tablet; Refill: 0    2. Dental infection  -     amoxicillin-clavulanate (Augmentin) 875-125 MG per tablet; Take 1 tablet by mouth 2 (Two) Times a Day.  Dispense: 20 tablet; Refill: 0      With recent dental infection, will treat with antibiotics. Patient will follow up " if persistent or worsening symptoms. May need US and/or labs for additional evaluation.           Follow Up     Return for As needed for persistent or worsening symptoms.

## 2024-04-04 ENCOUNTER — OFFICE VISIT (OUTPATIENT)
Dept: FAMILY MEDICINE CLINIC | Age: 49
End: 2024-04-04
Payer: OTHER GOVERNMENT

## 2024-04-04 VITALS
SYSTOLIC BLOOD PRESSURE: 119 MMHG | HEIGHT: 76 IN | WEIGHT: 238 LBS | DIASTOLIC BLOOD PRESSURE: 82 MMHG | BODY MASS INDEX: 28.98 KG/M2 | OXYGEN SATURATION: 96 % | HEART RATE: 86 BPM | TEMPERATURE: 97.8 F

## 2024-04-04 DIAGNOSIS — F43.10 PTSD (POST-TRAUMATIC STRESS DISORDER): ICD-10-CM

## 2024-04-04 DIAGNOSIS — F41.0 PANIC ATTACK: ICD-10-CM

## 2024-04-04 DIAGNOSIS — Z00.00 ANNUAL PHYSICAL EXAM: Primary | ICD-10-CM

## 2024-04-04 DIAGNOSIS — M79.605 LOWER EXTREMITY PAIN, LEFT: ICD-10-CM

## 2024-04-04 DIAGNOSIS — F41.9 ANXIETY: ICD-10-CM

## 2024-04-04 NOTE — PROGRESS NOTES
Chief Complaint  Nabil Connell presents to [facility] for Annual Exam    Subjective          History of Present Illness    Nabil is here today for preventive exam.  Declines covid, Tdap, zoster vaccines today. He thinks that he may be UTD on Tdap vaccine.   Sigmoidoscopy 6/26/17 with Dr Vences. He thinks that he was told to repeat in 10 years.   Never smoker.     [facility]. He reports lab work has been good except for his blood sugar but he had eaten prior to labs.   He has history of panic attacks/PTSD/anxiety. Diagnosed after [place] over 20 years ago. He has went to the ER several times over the last 5 years due to panic attacks. He is here today to be referred to psychiatrist as required by his insurance. His wife has seen Dr Martinez and he would like to see as well. He is currently on bupropion. Was previously on valium but [facility] to [facility].   He is no longer seeing cardiology. Last seen in 2020 . Advised PRN follow up. He is on carvedilol.   C/o left posterior knee pain. First noticed 1 year ago. Comes and goes. Denies swelling or skin discoloration. He has tried BioFreeze with some improvement.     Review of Systems   Constitutional:  Negative for chills and fever.   HENT:  Negative for ear pain and sore throat.    Eyes:  Negative for blurred vision and redness.   Respiratory:  Negative for cough and wheezing.    Cardiovascular:  Negative for leg swelling.   Gastrointestinal:  Negative for abdominal pain and vomiting.   Genitourinary:  Negative for frequency and urgency.   Musculoskeletal:         LLE pain     Skin:  Negative for rash.   Neurological:  Negative for seizures and syncope.   Psychiatric/Behavioral:  Positive for stress. The patient is nervous/anxious.          Allergies   Allergen Reactions    Paroxetine Other (See Comments)     Other Reaction(s): Cramp    Prazosin Dizziness and  "Headache    Quetiapine Other (See Comments)     Other Reaction(s): Sedated    Trazodone Other (See Comments)     Other Reaction(s): Suicidal thoughts    Venlafaxine Nausea Only    Zolpidem Other (See Comments)     Other Reaction(s): Suicidal thoughts      Past Medical History:   Diagnosis Date    Anxiety 2005    Depression 2005    PTSD combat related    Headache 1998    Hyperlipidemia 2012    Hypertension 2012    Irritable bowel syndrome 2005    Low back pain 2005     Current Outpatient Medications   Medication Sig Dispense Refill    buPROPion HCl (WELLBUTRIN PO) Take 600 mg by mouth Daily.      carvedilol (COREG) 12.5 MG tablet Take 1 tablet by mouth Daily.      HYDROcodone-Acetaminophen (XODOL) 7.5-300 MG per tablet As Needed.       No current facility-administered medications for this visit.     History reviewed. No pertinent surgical history.   Social History     Tobacco Use    Smoking status: Never     Passive exposure: Never    Smokeless tobacco: Never   Substance Use Topics    Alcohol use: Yes     Alcohol/week: 3.0 standard drinks of alcohol     Types: 3 Shots of liquor per week    Drug use: Not Currently     Types: Hydrocodone     History reviewed. No pertinent family history.  Health Maintenance Due   Topic Date Due    LIPID PANEL  Never done        There is no immunization history on file for this patient.     Objective     Vitals:    04/04/24 1331   BP: 119/82   BP Location: Left arm   Patient Position: Sitting   Cuff Size: Large Adult   Pulse: 86   Temp: 97.8 °F (36.6 °C)   TempSrc: Oral   SpO2: 96%   Weight: 108 kg (238 lb)   Height: 193 cm (76\")     Body mass index is 28.97 kg/m².                No results found.    Physical Exam  Vitals reviewed.   Constitutional:       General: He is not in acute distress.     Appearance: Normal appearance.   HENT:      Head: Normocephalic and atraumatic.      Right Ear: Hearing, tympanic membrane and ear canal normal.      Left Ear: Hearing, tympanic membrane and " ear canal normal.      Mouth/Throat:      Mouth: Mucous membranes are moist.   Eyes:      Extraocular Movements: Extraocular movements intact.      Pupils: Pupils are equal, round, and reactive to light.   Cardiovascular:      Rate and Rhythm: Normal rate and regular rhythm.   Pulmonary:      Effort: Pulmonary effort is normal. No respiratory distress.      Breath sounds: Normal breath sounds.   Abdominal:      General: Bowel sounds are normal.      Palpations: Abdomen is soft.   Musculoskeletal:      Cervical back: Normal range of motion and neck supple.      Left knee: No swelling or erythema.        Legs:    Skin:     General: Skin is warm and dry.   Neurological:      Mental Status: He is alert and oriented to person, place, and time.   Psychiatric:         Mood and Affect: Mood normal.           Result Review :                               Assessment and Plan      Assessment & Plan  Annual physical exam  Appropriate screenings and vaccinations were reviewed with the pt and offered as indicated.     Anxiety    Panic attack    PTSD (post-traumatic stress disorder)  Will get him set up with psychiatrist as requested. He will continue bupropion.  Lower extremity pain, left  Likely inflammation. He may continue BioFreeze. May also try Voltaren gel, lidocaine patches. Stretching.     Orders Placed This Encounter   Procedures    Ambulatory Referral to Psychiatry                    Follow Up     No follow-ups on file.

## 2024-06-11 ENCOUNTER — OFFICE VISIT (OUTPATIENT)
Dept: FAMILY MEDICINE CLINIC | Age: 49
End: 2024-06-11
Payer: OTHER GOVERNMENT

## 2024-06-11 VITALS
HEIGHT: 76 IN | TEMPERATURE: 98.4 F | SYSTOLIC BLOOD PRESSURE: 116 MMHG | DIASTOLIC BLOOD PRESSURE: 83 MMHG | OXYGEN SATURATION: 98 % | BODY MASS INDEX: 29.27 KG/M2 | WEIGHT: 240.4 LBS | HEART RATE: 92 BPM

## 2024-06-11 DIAGNOSIS — R13.10 PAIN WITH SWALLOWING: ICD-10-CM

## 2024-06-11 DIAGNOSIS — R10.13 EPIGASTRIC PAIN: ICD-10-CM

## 2024-06-11 PROCEDURE — 99214 OFFICE O/P EST MOD 30 MIN: CPT | Performed by: PHYSICIAN ASSISTANT

## 2024-06-11 NOTE — PROGRESS NOTES
"Subjective     CHIEF COMPLAINT    Chief Complaint   Patient presents with    ESOPHAGUS PAIN     X 2 days when swallowing food            History of Present Illness  This is a 48-year-old male presenting to the clinic complaining of pain with swallowing for the last 3 days.  He states he had \"really bad heartburn\" on the night of 6/8/2024.  This woke him up from sleep and improved mildly with Zantac.  Since that time he states he has had a \"burning pain\" from his esophagus to his stomach with a shooting pain at the end, when he feels like the food has reached his stomach.  His pain is somewhat improved today compared to yesterday and he did not have pain when eating blueberries earlier today.  He reports taking hydrocodone for his back pain today so he is unsure if that is affecting his pain level with the swelling as well.  He has had some nausea but no vomiting, chest pain or shortness of breath.            Review of Systems   Constitutional:  Negative for chills and fever.   Respiratory:  Negative for chest tightness and shortness of breath.    Gastrointestinal:  Positive for abdominal pain (heart burn) and nausea. Negative for blood in stool, diarrhea and vomiting.            Past Medical History:   Diagnosis Date    Anxiety 2005    Depression 2005    PTSD combat related    Headache 1998    Hyperlipidemia 2012    Hypertension 2012    Irritable bowel syndrome 2005    Low back pain 2005            History reviewed. No pertinent surgical history.         History reviewed. No pertinent family history.         Social History     Socioeconomic History    Marital status:    Tobacco Use    Smoking status: Never     Passive exposure: Never    Smokeless tobacco: Never   Vaping Use    Vaping status: Never Used   Substance and Sexual Activity    Alcohol use: Yes     Alcohol/week: 3.0 standard drinks of alcohol     Types: 3 Shots of liquor per week    Drug use: Never     Types: Hydrocodone    Sexual activity: Yes     " "Partners: Female     Birth control/protection: Tubal ligation, Surgical            Allergies   Allergen Reactions    Paroxetine Other (See Comments)     Other Reaction(s): Cramp    Prazosin Dizziness and Headache    Quetiapine Other (See Comments)     Other Reaction(s): Sedated    Trazodone Other (See Comments)     Other Reaction(s): Suicidal thoughts    Venlafaxine Nausea Only    Zolpidem Other (See Comments)     Other Reaction(s): Suicidal thoughts            Current Outpatient Medications on File Prior to Visit   Medication Sig Dispense Refill    carvedilol (COREG) 12.5 MG tablet Take 1 tablet by mouth Daily.      HYDROcodone-Acetaminophen (XODOL) 7.5-300 MG per tablet As Needed.      buPROPion HCl (WELLBUTRIN PO) Take 600 mg by mouth Daily. (Patient not taking: Reported on 6/11/2024)       No current facility-administered medications on file prior to visit.            /83 (BP Location: Left arm, Patient Position: Sitting, Cuff Size: Large Adult)   Pulse 92   Temp 98.4 °F (36.9 °C) (Oral)   Ht 193 cm (76\")   Wt 109 kg (240 lb 6.4 oz)   SpO2 98%   BMI 29.26 kg/m²          Objective     Physical Exam  Vitals and nursing note reviewed.   Constitutional:       General: He is not in acute distress.     Appearance: Normal appearance.   HENT:      Head: Normocephalic and atraumatic.   Eyes:      General: No scleral icterus.     Conjunctiva/sclera: Conjunctivae normal.   Cardiovascular:      Rate and Rhythm: Normal rate and regular rhythm.      Heart sounds: Normal heart sounds.   Pulmonary:      Effort: Pulmonary effort is normal.      Breath sounds: Normal breath sounds.   Abdominal:      General: Bowel sounds are normal. There is no distension.      Palpations: Abdomen is soft.      Tenderness: There is abdominal tenderness (mild, luq/epigastric\). There is no guarding or rebound.   Skin:     General: Skin is warm and dry.   Neurological:      Mental Status: He is alert and oriented to person, place, and " time.   Psychiatric:         Mood and Affect: Mood normal.         Behavior: Behavior normal.                No Radiology Exams Resulted Within Past 24 Hours                    Assessment & Plan  Epigastric pain  Magic mouthwash prescription provided.  Will also check labs to rule out abdominal cause of pain.  Chest x-ray to evaluate for esophageal perforation, but this seems unlikely.  Follow-up with PCP if no improvement or go to the emergency department if symptoms are worsening.  Pain with swallowing      Orders Placed This Encounter   Procedures    XR Chest PA & Lateral    Comprehensive metabolic panel    Lipase    CBC w AUTO Differential     New Medications Ordered This Visit   Medications    Magic Mouthwash Oral Suspension (diphenhydrAMINE HCl - aluminum & magnesium hydroxide-simethicone - lidocaine)     Sig: Swish and spit 5 mL Every 4 (Four) Hours As Needed (mouth discomfort) for up to 3 days.     Dispense:  180 mL     Refill:  0     60 mL 2% viscous lidocaine  60 mL benadryl  60 mL Maalox                FOR FULL DISCHARGE INSTRUCTIONS/COMMENTS/HANDOUTS please see the   AVS

## 2024-06-12 ENCOUNTER — HOSPITAL ENCOUNTER (OUTPATIENT)
Dept: GENERAL RADIOLOGY | Facility: HOSPITAL | Age: 49
Discharge: HOME | End: 2024-06-12
Payer: OTHER GOVERNMENT

## 2024-06-12 ENCOUNTER — LAB (OUTPATIENT)
Dept: LAB | Facility: HOSPITAL | Age: 49
End: 2024-06-12
Payer: OTHER GOVERNMENT

## 2024-06-12 DIAGNOSIS — R13.10 PAIN WITH SWALLOWING: ICD-10-CM

## 2024-06-12 DIAGNOSIS — R10.13 EPIGASTRIC PAIN: ICD-10-CM

## 2024-06-12 LAB
ALBUMIN SERPL-MCNC: 4.6 G/DL (ref 3.5–5.2)
ALBUMIN/GLOB SERPL: 1.7 G/DL
ALP SERPL-CCNC: 82 U/L (ref 39–117)
ALT SERPL W P-5'-P-CCNC: 24 U/L (ref 1–41)
ANION GAP SERPL CALCULATED.3IONS-SCNC: 9.7 MMOL/L (ref 5–15)
AST SERPL-CCNC: 18 U/L (ref 1–40)
BASOPHILS # BLD AUTO: 0.03 10*3/MM3 (ref 0–0.2)
BASOPHILS NFR BLD AUTO: 0.5 % (ref 0–1.5)
BILIRUB SERPL-MCNC: 0.4 MG/DL (ref 0–1.2)
BUN SERPL-MCNC: 15 MG/DL (ref 6–20)
BUN/CREAT SERPL: 12.5 (ref 7–25)
CALCIUM SPEC-SCNC: 10 MG/DL (ref 8.6–10.5)
CHLORIDE SERPL-SCNC: 102 MMOL/L (ref 98–107)
CO2 SERPL-SCNC: 27.3 MMOL/L (ref 22–29)
CREAT SERPL-MCNC: 1.2 MG/DL (ref 0.76–1.27)
DEPRECATED RDW RBC AUTO: 36.7 FL (ref 37–54)
EGFRCR SERPLBLD CKD-EPI 2021: 73.7 ML/MIN/1.73
EOSINOPHIL # BLD AUTO: 0.23 10*3/MM3 (ref 0–0.4)
EOSINOPHIL NFR BLD AUTO: 4.2 % (ref 0.3–6.2)
ERYTHROCYTE [DISTWIDTH] IN BLOOD BY AUTOMATED COUNT: 11.4 % (ref 12.3–15.4)
GLOBULIN UR ELPH-MCNC: 2.7 GM/DL
GLUCOSE SERPL-MCNC: 102 MG/DL (ref 65–99)
HCT VFR BLD AUTO: 45.3 % (ref 37.5–51)
HGB BLD-MCNC: 15.4 G/DL (ref 13–17.7)
IMM GRANULOCYTES # BLD AUTO: 0.02 10*3/MM3 (ref 0–0.05)
IMM GRANULOCYTES NFR BLD AUTO: 0.4 % (ref 0–0.5)
LIPASE SERPL-CCNC: 68 U/L (ref 13–60)
LYMPHOCYTES # BLD AUTO: 2.17 10*3/MM3 (ref 0.7–3.1)
LYMPHOCYTES NFR BLD AUTO: 39.7 % (ref 19.6–45.3)
MCH RBC QN AUTO: 29.3 PG (ref 26.6–33)
MCHC RBC AUTO-ENTMCNC: 34 G/DL (ref 31.5–35.7)
MCV RBC AUTO: 86.3 FL (ref 79–97)
MONOCYTES # BLD AUTO: 0.43 10*3/MM3 (ref 0.1–0.9)
MONOCYTES NFR BLD AUTO: 7.9 % (ref 5–12)
NEUTROPHILS NFR BLD AUTO: 2.58 10*3/MM3 (ref 1.7–7)
NEUTROPHILS NFR BLD AUTO: 47.3 % (ref 42.7–76)
PLATELET # BLD AUTO: 238 10*3/MM3 (ref 140–450)
PMV BLD AUTO: 10.6 FL (ref 6–12)
POTASSIUM SERPL-SCNC: 4.5 MMOL/L (ref 3.5–5.2)
PROT SERPL-MCNC: 7.3 G/DL (ref 6–8.5)
RBC # BLD AUTO: 5.25 10*6/MM3 (ref 4.14–5.8)
SODIUM SERPL-SCNC: 139 MMOL/L (ref 136–145)
WBC NRBC COR # BLD AUTO: 5.46 10*3/MM3 (ref 3.4–10.8)

## 2024-06-12 PROCEDURE — 71046 X-RAY EXAM CHEST 2 VIEWS: CPT

## 2024-06-12 PROCEDURE — 80053 COMPREHEN METABOLIC PANEL: CPT

## 2024-06-12 PROCEDURE — 83690 ASSAY OF LIPASE: CPT

## 2024-06-12 PROCEDURE — 36415 COLL VENOUS BLD VENIPUNCTURE: CPT

## 2024-06-12 PROCEDURE — 85025 COMPLETE CBC W/AUTO DIFF WBC: CPT

## 2024-06-18 ENCOUNTER — LAB (OUTPATIENT)
Dept: LAB | Facility: HOSPITAL | Age: 49
End: 2024-06-18
Payer: OTHER GOVERNMENT

## 2024-06-18 DIAGNOSIS — R10.13 EPIGASTRIC PAIN: ICD-10-CM

## 2024-06-18 LAB — LIPASE SERPL-CCNC: 61 U/L (ref 13–60)

## 2024-06-18 PROCEDURE — 36415 COLL VENOUS BLD VENIPUNCTURE: CPT

## 2024-06-18 PROCEDURE — 83690 ASSAY OF LIPASE: CPT

## 2024-06-24 ENCOUNTER — LAB (OUTPATIENT)
Dept: LAB | Facility: HOSPITAL | Age: 49
End: 2024-06-24
Payer: OTHER GOVERNMENT

## 2024-06-24 ENCOUNTER — OFFICE VISIT (OUTPATIENT)
Dept: PSYCHIATRY | Facility: CLINIC | Age: 49
End: 2024-06-24
Payer: OTHER GOVERNMENT

## 2024-06-24 VITALS
HEART RATE: 74 BPM | DIASTOLIC BLOOD PRESSURE: 101 MMHG | BODY MASS INDEX: 29.76 KG/M2 | WEIGHT: 244.4 LBS | HEIGHT: 76 IN | SYSTOLIC BLOOD PRESSURE: 136 MMHG

## 2024-06-24 DIAGNOSIS — F43.10 POST TRAUMATIC STRESS DISORDER (PTSD): ICD-10-CM

## 2024-06-24 DIAGNOSIS — F41.0 PANIC DISORDER: ICD-10-CM

## 2024-06-24 DIAGNOSIS — F33.1 MAJOR DEPRESSIVE DISORDER, RECURRENT EPISODE, MODERATE: ICD-10-CM

## 2024-06-24 DIAGNOSIS — F51.05 INSOMNIA DUE TO MENTAL CONDITION: ICD-10-CM

## 2024-06-24 DIAGNOSIS — F41.1 GENERALIZED ANXIETY DISORDER: ICD-10-CM

## 2024-06-24 DIAGNOSIS — F41.0 PANIC ATTACKS: ICD-10-CM

## 2024-06-24 DIAGNOSIS — F41.0 PANIC ATTACKS: Primary | ICD-10-CM

## 2024-06-24 LAB
AMPHET+METHAMPHET UR QL: NEGATIVE
BARBITURATES UR QL SCN: NEGATIVE
BENZODIAZ UR QL SCN: NEGATIVE
CANNABINOIDS SERPL QL: NEGATIVE
COCAINE UR QL: NEGATIVE
FENTANYL UR-MCNC: NEGATIVE NG/ML
METHADONE UR QL SCN: NEGATIVE
OPIATES UR QL: NEGATIVE
OXYCODONE UR QL SCN: NEGATIVE

## 2024-06-24 PROCEDURE — 80307 DRUG TEST PRSMV CHEM ANLYZR: CPT

## 2024-06-24 PROCEDURE — 90792 PSYCH DIAG EVAL W/MED SRVCS: CPT | Performed by: STUDENT IN AN ORGANIZED HEALTH CARE EDUCATION/TRAINING PROGRAM

## 2024-06-24 RX ORDER — LIDOCAINE HYDROCHLORIDE 20 MG/ML
SOLUTION OROPHARYNGEAL
COMMUNITY
Start: 2024-06-12

## 2024-06-24 RX ORDER — DIAZEPAM 10 MG/1
10 TABLET ORAL DAILY PRN
Qty: 15 TABLET | Refills: 5 | Status: SHIPPED | OUTPATIENT
Start: 2024-06-24

## 2024-06-24 RX ORDER — OXYCODONE HYDROCHLORIDE AND ACETAMINOPHEN 5; 325 MG/1; MG/1
TABLET ORAL
COMMUNITY
End: 2024-06-24

## 2024-06-24 RX ORDER — METOPROLOL TARTRATE 50 MG/1
1 TABLET, FILM COATED ORAL 2 TIMES DAILY
COMMUNITY
End: 2024-06-24

## 2024-06-24 RX ORDER — HYDROXYZINE 50 MG/1
50 TABLET, FILM COATED ORAL 3 TIMES DAILY PRN
Qty: 90 TABLET | Refills: 1 | Status: SHIPPED | OUTPATIENT
Start: 2024-06-24

## 2024-06-24 RX ORDER — ESCITALOPRAM OXALATE 10 MG/1
10 TABLET ORAL DAILY
Qty: 30 TABLET | Refills: 2 | Status: SHIPPED | OUTPATIENT
Start: 2024-06-24

## 2024-06-24 RX ORDER — DIAZEPAM 10 MG/1
1 TABLET ORAL 3 TIMES DAILY
COMMUNITY
End: 2024-06-24 | Stop reason: SDUPTHER

## 2024-06-24 NOTE — TREATMENT PLAN
Multi-Disciplinary Problems (from Behavioral Health Treatment Plan)      Active Problems       Problem: Anxiety  Start Date: 06/24/24      Problem Details: The patient self-scales this problem as a 1 with 10 being the worst.          Goal Priority Start Date Expected End Date End Date    Patient will develop and implement behavioral and cognitive strategies to reduce anxiety and irrational fears. -- 06/24/24 -- --    Goal Details: Progress toward goal:  Not appropriate to rate progress toward goal since this is the initial treatment plan.          Goal Intervention Frequency Start Date End Date    Help patient explore past emotional issues in relation to present anxiety. Q Month 06/24/24 --    Intervention Details: Duration of treatment until until remission.          Goal Intervention Frequency Start Date End Date    Help patient develop an awareness of their cognitive and physical responses to anxiety. Q Month 06/24/24 --    Intervention Details: Duration of treatment until until remission of symptoms.                  Problem: Depression  Start Date: 06/24/24      Problem Details: The patient self-scales this problem as a 1 with 10 being the worst.          Goal Priority Start Date Expected End Date End Date    Patient will demonstrate the ability to initiate new constructive life skills outside of sessions on a consistent basis. -- 06/24/24 -- --    Goal Details: Progress toward goal:  Not appropriate to rate progress toward goal since this is the initial treatment plan.          Goal Intervention Frequency Start Date End Date    Assist patient in setting attainable activities of daily living goals. PRN 06/24/24 --      Goal Intervention Frequency Start Date End Date    Provide education about depression Q Month 06/24/24 --    Intervention Details: Duration of treatment until until remission of symptoms.          Goal Intervention Frequency Start Date End Date    Assist patient in developing healthy coping  strategies. Q Month 06/24/24 --    Intervention Details: Duration of treatment until until remission of symptoms.                          Reviewed By       Alexus Martinez MD 06/24/24 0629                     I have discussed and reviewed this treatment plan with the patient.
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
## 2024-06-24 NOTE — PATIENT INSTRUCTIONS
1.  Please return to clinic at your next scheduled visit.  Contact the clinic ([phone]) at least 24 hours prior in the event you need to cancel.  2.  Do no harm to yourself or others.    3.  Avoid alcohol and drugs.    4.  Take all medications as prescribed.  Please contact the clinic with any concerns. If you are in need of medication refills, please call the clinic at [phone].    5. Should you want to get in touch with your provider, Dr. Alexus Martinez, please utilize [facility] [employer] or contact the office ([phone]), and staff will be able to page Dr. Martinez directly.  6.  In the event you have personal crisis, contact the following crisis numbers: Suicide Prevention Hotline 1-724.932.4515; HENRI Helpline 7-052-199-HENRI; [facility] Emergency Room [phone]; text HELLO to 809013; or 495.

## 2024-06-28 ENCOUNTER — PATIENT ROUNDING (BHMG ONLY) (OUTPATIENT)
Dept: PSYCHIATRY | Facility: CLINIC | Age: 49
End: 2024-06-28
Payer: OTHER GOVERNMENT

## 2024-06-28 NOTE — PROGRESS NOTES
June 28, 2024    Hello, may I speak with Nabil Connell?    My name is jackie      I am  with [facility]  [facility]  [address]  [place] [address] [phone].    Before we get started may I verify your date of birth? 1975    I am calling to officially welcome you to our practice and ask about your recent visit. Is this a good time to talk? No.  Patient was busy    Tell me about your visit with us. What things went well?         We're always looking for ways to make our patients' experiences even better. Do you have recommendations on ways we may improve?      Overall were you satisfied with your first visit to our practice?        I appreciate you taking the time to speak with me today. Is there anything else I can do for you?     Thank you, and have a great day.

## 2024-07-22 ENCOUNTER — OFFICE VISIT (OUTPATIENT)
Dept: PSYCHIATRY | Facility: CLINIC | Age: 49
End: 2024-07-22
Payer: OTHER GOVERNMENT

## 2024-07-22 VITALS
BODY MASS INDEX: 29.47 KG/M2 | DIASTOLIC BLOOD PRESSURE: 77 MMHG | HEIGHT: 76 IN | HEART RATE: 85 BPM | WEIGHT: 242 LBS | SYSTOLIC BLOOD PRESSURE: 129 MMHG | OXYGEN SATURATION: 99 %

## 2024-07-22 DIAGNOSIS — F43.10 POST TRAUMATIC STRESS DISORDER (PTSD): ICD-10-CM

## 2024-07-22 DIAGNOSIS — F33.1 MAJOR DEPRESSIVE DISORDER, RECURRENT EPISODE, MODERATE: ICD-10-CM

## 2024-07-22 DIAGNOSIS — F41.0 PANIC ATTACKS: ICD-10-CM

## 2024-07-22 DIAGNOSIS — F41.1 GENERALIZED ANXIETY DISORDER: Primary | ICD-10-CM

## 2024-07-22 DIAGNOSIS — F41.0 PANIC DISORDER: ICD-10-CM

## 2024-07-22 DIAGNOSIS — F51.05 INSOMNIA DUE TO MENTAL CONDITION: ICD-10-CM

## 2024-07-22 PROCEDURE — 90833 PSYTX W PT W E/M 30 MIN: CPT | Performed by: STUDENT IN AN ORGANIZED HEALTH CARE EDUCATION/TRAINING PROGRAM

## 2024-07-22 PROCEDURE — 99214 OFFICE O/P EST MOD 30 MIN: CPT | Performed by: STUDENT IN AN ORGANIZED HEALTH CARE EDUCATION/TRAINING PROGRAM

## 2024-07-22 RX ORDER — ESCITALOPRAM OXALATE 10 MG/1
15 TABLET ORAL DAILY
Qty: 45 TABLET | Refills: 2 | Status: SHIPPED | OUTPATIENT
Start: 2024-07-22
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
## 2024-07-22 NOTE — PATIENT INSTRUCTIONS
1.  Please return to clinic at your next scheduled visit.  Contact the clinic ([phone]) at least 24 hours prior in the event you need to cancel.  2.  Do no harm to yourself or others.    3.  Avoid alcohol and drugs.    4.  Take all medications as prescribed.  Please contact the clinic with any concerns. If you are in need of medication refills, please call the clinic at [phone].    5. Should you want to get in touch with your provider, Dr. Alexus Martinez, please utilize [employer] or contact the office ([phone]), and staff will be able to page Dr. Martinez directly.  6.  In the event you have personal crisis, contact the following crisis numbers: Suicide Prevention Hotline 1-448.278.8001; [facility] Helpline 2-000-923-HENRI; [facility] Emergency Room [phone]; text HELLO to [id number]; or 753.

## 2024-08-19 ENCOUNTER — TELEMEDICINE (OUTPATIENT)
Dept: PSYCHIATRY | Facility: CLINIC | Age: 49
End: 2024-08-19
Payer: OTHER GOVERNMENT

## 2024-08-19 ENCOUNTER — TELEPHONE (OUTPATIENT)
Dept: PSYCHIATRY | Facility: CLINIC | Age: 49
End: 2024-08-19

## 2024-08-19 DIAGNOSIS — F33.1 MAJOR DEPRESSIVE DISORDER, RECURRENT EPISODE, MODERATE: ICD-10-CM

## 2024-08-19 DIAGNOSIS — F41.0 PANIC DISORDER: ICD-10-CM

## 2024-08-19 DIAGNOSIS — F51.05 INSOMNIA DUE TO MENTAL CONDITION: ICD-10-CM

## 2024-08-19 DIAGNOSIS — F41.0 PANIC ATTACKS: ICD-10-CM

## 2024-08-19 DIAGNOSIS — F43.10 POST TRAUMATIC STRESS DISORDER (PTSD): ICD-10-CM

## 2024-08-19 DIAGNOSIS — F41.1 GENERALIZED ANXIETY DISORDER: Primary | ICD-10-CM

## 2024-08-19 PROCEDURE — 99214 OFFICE O/P EST MOD 30 MIN: CPT | Performed by: STUDENT IN AN ORGANIZED HEALTH CARE EDUCATION/TRAINING PROGRAM
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
## 2024-08-19 NOTE — PATIENT INSTRUCTIONS
1.  Please return to clinic at your next scheduled visit.  Contact the clinic ([phone]) at least 24 hours prior in the event you need to cancel.  2.  Do no harm to yourself or others.    3.  Avoid alcohol and drugs.    4.  Take all medications as prescribed.  Please contact the clinic with any concerns. If you are in need of medication refills, please call the clinic at [phone].    5. Should you want to get in touch with your provider, Dr. Alexus Martinez, please utilize [facility] or contact the office ([phone]), and staff will be able to page Dr. Martinez directly.  6.  In the event you have personal crisis, contact the following crisis numbers: Suicide Prevention Hotline 1-298.666.2264; [facility] Helpline 6-355-313-HENRI; [facility] Emergency Room [phone]; text HELLO to [phone]; or 065.

## 2024-09-16 ENCOUNTER — TELEMEDICINE (OUTPATIENT)
Dept: PSYCHIATRY | Facility: CLINIC | Age: 49
End: 2024-09-16
Payer: OTHER GOVERNMENT

## 2024-09-16 DIAGNOSIS — F33.1 MAJOR DEPRESSIVE DISORDER, RECURRENT EPISODE, MODERATE: ICD-10-CM

## 2024-09-16 DIAGNOSIS — F51.05 INSOMNIA DUE TO MENTAL CONDITION: ICD-10-CM

## 2024-09-16 DIAGNOSIS — F41.0 PANIC DISORDER: ICD-10-CM

## 2024-09-16 DIAGNOSIS — F41.1 GENERALIZED ANXIETY DISORDER: Primary | ICD-10-CM

## 2024-09-16 DIAGNOSIS — F43.10 POST TRAUMATIC STRESS DISORDER (PTSD): ICD-10-CM

## 2024-09-16 DIAGNOSIS — F41.0 PANIC ATTACKS: ICD-10-CM

## 2024-09-16 PROCEDURE — 99214 OFFICE O/P EST MOD 30 MIN: CPT | Performed by: STUDENT IN AN ORGANIZED HEALTH CARE EDUCATION/TRAINING PROGRAM

## 2024-09-16 RX ORDER — BUSPIRONE HYDROCHLORIDE 10 MG/1
10 TABLET ORAL 2 TIMES DAILY
Qty: 60 TABLET | Refills: 2 | Status: SHIPPED | OUTPATIENT
Start: 2024-09-16

## 2024-09-16 RX ORDER — ESCITALOPRAM OXALATE 10 MG/1
10 TABLET ORAL DAILY
Qty: 90 TABLET | Refills: 1 | Status: SHIPPED | OUTPATIENT
Start: 2024-09-16

## 2024-09-17 ENCOUNTER — TELEPHONE (OUTPATIENT)
Dept: PSYCHIATRY | Facility: CLINIC | Age: 49
End: 2024-09-17
Payer: OTHER GOVERNMENT

## 2024-12-27 ENCOUNTER — TELEPHONE (OUTPATIENT)
Dept: FAMILY MEDICINE CLINIC | Age: 49
End: 2024-12-27
Payer: OTHER GOVERNMENT

## 2024-12-27 NOTE — TELEPHONE ENCOUNTER
Caller: Nabil Connell    Relationship: Self    Best call back number: [phone]     What orders are you requesting (i.e. lab or imaging): LIPASE    In what timeframe would the patient need to come in: ASAP    Where will you receive your lab/imaging services: [facility]

## 2024-12-30 NOTE — TELEPHONE ENCOUNTER
We care about providing you with the best quality of healthcare, which requires us to evaluate you in clinic before making a decision about what treatment would be best for your current healthcare problems.  Please call the office at [phone] to schedule an appointment.

## 2025-01-03 ENCOUNTER — OFFICE VISIT (OUTPATIENT)
Dept: FAMILY MEDICINE CLINIC | Age: 50
End: 2025-01-03
Payer: OTHER GOVERNMENT

## 2025-01-03 ENCOUNTER — LAB (OUTPATIENT)
Dept: LAB | Facility: HOSPITAL | Age: 50
End: 2025-01-03
Payer: OTHER GOVERNMENT

## 2025-01-03 VITALS
DIASTOLIC BLOOD PRESSURE: 90 MMHG | HEIGHT: 76 IN | BODY MASS INDEX: 30.05 KG/M2 | HEART RATE: 84 BPM | TEMPERATURE: 98.3 F | SYSTOLIC BLOOD PRESSURE: 129 MMHG | WEIGHT: 246.8 LBS | OXYGEN SATURATION: 93 %

## 2025-01-03 DIAGNOSIS — K30 STOMACH UPSET: ICD-10-CM

## 2025-01-03 DIAGNOSIS — R19.7 DIARRHEA, UNSPECIFIED TYPE: ICD-10-CM

## 2025-01-03 DIAGNOSIS — K58.0 IRRITABLE BOWEL SYNDROME WITH DIARRHEA: Primary | ICD-10-CM

## 2025-01-03 PROCEDURE — 36415 COLL VENOUS BLD VENIPUNCTURE: CPT

## 2025-01-03 PROCEDURE — 86003 ALLG SPEC IGE CRUDE XTRC EA: CPT

## 2025-01-03 PROCEDURE — 99214 OFFICE O/P EST MOD 30 MIN: CPT | Performed by: NURSE PRACTITIONER

## 2025-01-03 PROCEDURE — 86364 TISS TRNSGLTMNASE EA IG CLAS: CPT

## 2025-01-03 PROCEDURE — 82785 ASSAY OF IGE: CPT

## 2025-01-03 PROCEDURE — 82784 ASSAY IGA/IGD/IGG/IGM EACH: CPT

## 2025-01-03 PROCEDURE — 86008 ALLG SPEC IGE RECOMB EA: CPT

## 2025-01-03 PROCEDURE — 86231 EMA EACH IG CLASS: CPT
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
## 2025-01-03 NOTE — ASSESSMENT & PLAN NOTE
Will treat with course of xifaxan and refer him to GI for further evaluation.   Orders:    Ambulatory Referral to Gastroenterology    riFAXIMin (Xifaxan) 550 MG tablet; Take 1 tablet by mouth Every 8 (Eight) Hours for 14 days.

## 2025-01-07 ENCOUNTER — TELEPHONE (OUTPATIENT)
Dept: FAMILY MEDICINE CLINIC | Age: 50
End: 2025-01-07
Payer: OTHER GOVERNMENT

## 2025-01-07 LAB
ALPHA-GAL IGE QN: <0.1 KU/L
BEEF IGE QN: <0.1 KU/L
CONV CLASS DESCRIPTION: NORMAL
ENDOMYSIUM IGA SER QL: NEGATIVE
IGA SERPL-MCNC: 98 MG/DL (ref 90–386)
IGE SERPL-ACNC: 21 IU/ML (ref 6–495)
LAMB IGE QN: <0.1 KU/L
PORK IGE QN: <0.1 KU/L
TTG IGA SER-ACNC: <2 U/ML (ref 0–3)

## 2025-01-07 NOTE — TELEPHONE ENCOUNTER
PA :riFAXIMin (Xifaxan) 550 MG tablet   Denied:  Coverage is provided when the medication is prescribed by, or in consultation with, a gastroenterologist. Coverage cannot be authorized at this time.

## 2025-01-07 NOTE — TELEPHONE ENCOUNTER
Please let Nabil know that unfortunately xifaxan prescription denied by insurance as not prescribed by gastroenterologist.

## 2025-04-21 ENCOUNTER — OFFICE VISIT (OUTPATIENT)
Dept: GASTROENTEROLOGY | Facility: CLINIC | Age: 50
End: 2025-04-21
Payer: MEDICARE

## 2025-04-21 VITALS
DIASTOLIC BLOOD PRESSURE: 91 MMHG | SYSTOLIC BLOOD PRESSURE: 127 MMHG | BODY MASS INDEX: 29.06 KG/M2 | WEIGHT: 238.6 LBS | HEIGHT: 76 IN | HEART RATE: 96 BPM

## 2025-04-21 DIAGNOSIS — K58.0 IRRITABLE BOWEL SYNDROME WITH DIARRHEA: Primary | ICD-10-CM

## 2025-04-21 PROCEDURE — 99214 OFFICE O/P EST MOD 30 MIN: CPT | Performed by: NURSE PRACTITIONER

## 2025-04-21 PROCEDURE — 1159F MED LIST DOCD IN RCRD: CPT | Performed by: NURSE PRACTITIONER

## 2025-04-21 PROCEDURE — 3080F DIAST BP >= 90 MM HG: CPT | Performed by: NURSE PRACTITIONER

## 2025-04-21 PROCEDURE — 1160F RVW MEDS BY RX/DR IN RCRD: CPT | Performed by: NURSE PRACTITIONER

## 2025-04-21 PROCEDURE — G2211 COMPLEX E/M VISIT ADD ON: HCPCS | Performed by: NURSE PRACTITIONER

## 2025-04-21 PROCEDURE — 3074F SYST BP LT 130 MM HG: CPT | Performed by: NURSE PRACTITIONER

## 2025-04-21 RX ORDER — AMITRIPTYLINE HYDROCHLORIDE 10 MG/1
10 TABLET ORAL NIGHTLY
Qty: 90 TABLET | Refills: 1 | Status: SHIPPED | OUTPATIENT
Start: 2025-04-21

## 2025-04-21 RX ORDER — PANTOPRAZOLE SODIUM 20 MG/1
20 TABLET, DELAYED RELEASE ORAL DAILY
COMMUNITY
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
## 2025-04-24 ENCOUNTER — TELEPHONE (OUTPATIENT)
Dept: OTHER | Facility: HOSPITAL | Age: 50
End: 2025-04-24
Payer: OTHER GOVERNMENT

## 2025-04-24 NOTE — TELEPHONE ENCOUNTER
Xifaxan PA approved thru 4/24/26. [facility] pharmacy notified via Cover My Meds.     Angelica Gonsales, PharmD  Specialty Pharmacist  Pulmonology/Gastroenterology    [phone]

## 2025-05-01 ENCOUNTER — PATIENT ROUNDING (BHMG ONLY) (OUTPATIENT)
Dept: GASTROENTEROLOGY | Facility: CLINIC | Age: 50
End: 2025-05-01
Payer: OTHER GOVERNMENT

## 2025-05-01 NOTE — PROGRESS NOTES
"5/1/2025      Hello, may I speak with Nabil Connell     My name is Constance. I am calling from [facility]. I show that you had a recent visit with GINGER Buenrostro.    Before we get started may I verify your date of birth? 1975    I am calling to officially welcome you to our practice and ask about your recent visit. Is this a good time to talk?  Yes     Tell me about your visit with us. What things went well? \"Everything went well, Madeline was willing to help & that put me at ease\"    We strive to ensure that we protect your safety and privacy. Is there anything we could have done to improve this during your visit?    No     We're always looking for ways to make our patients' experiences even better. Do you have recommendations on ways we may improve? No     Overall were you satisfied with your first visit to our practice?  Yes     I appreciate you taking the time to speak with me today. Is there anything else I can do for you?   No     I am glad to hear that you had a very good visit and I appreciate you taking the time to provide feedback on this call. We would greatly appreciate you filling out a survey if you receive one in the mail, email or text. This is a great opportunity to provide any additional feedback that you may think of after this call as well.       Thank you, and have a great day.   "

## 2025-05-05 ENCOUNTER — TELEPHONE (OUTPATIENT)
Dept: GASTROENTEROLOGY | Facility: CLINIC | Age: 50
End: 2025-05-05
Payer: OTHER GOVERNMENT

## 2025-05-05 NOTE — TELEPHONE ENCOUNTER
Patient left a voicemail stating after taking Xifaxan he is having mucus diarrhea and ringing in his ears. He would like to know what to do. Please advise

## 2025-05-05 NOTE — TELEPHONE ENCOUNTER
After how many doses did he experience this?  I do not think the ringing in ears is related.  Did he also start amitriptyline?

## 2025-05-05 NOTE — LETTER
May 8, 2025    Nabil Connell  [address] [place] [address]    5/8/2025          Nabil Fisher Becki  [address] [place] [address]      Dear Nabil Connell,    Thank you for choosing [facility]. So far, we've been unsuccessful in connecting with you to discuss the treatment plan recommended by Belinda Del Angel MD. It is very important that you contact the office. Not following through with ordered procedures, testing, or follow-up visits could lead to serious complications that may be life-threatening, including a risk of incurable cancer if not found with early detection.    We value you as a patient and look forward to hearing from you.         Sincerely,        Gastroenterology Tamara Tariq MA